# Patient Record
Sex: OTHER/UNKNOWN | URBAN - METROPOLITAN AREA
[De-identification: names, ages, dates, MRNs, and addresses within clinical notes are randomized per-mention and may not be internally consistent; named-entity substitution may affect disease eponyms.]

---

## 2019-02-19 ENCOUNTER — APPOINTMENT (OUTPATIENT)
Age: 34
Setting detail: DERMATOLOGY
End: 2019-02-20

## 2019-02-19 VITALS
WEIGHT: 210 LBS | HEART RATE: 83 BPM | SYSTOLIC BLOOD PRESSURE: 113 MMHG | DIASTOLIC BLOOD PRESSURE: 52 MMHG | HEIGHT: 69 IN

## 2019-02-19 DIAGNOSIS — L85.3 XEROSIS CUTIS: ICD-10-CM

## 2019-02-19 DIAGNOSIS — L71.8 OTHER ROSACEA: ICD-10-CM

## 2019-02-19 PROCEDURE — 99214 OFFICE O/P EST MOD 30 MIN: CPT

## 2019-02-19 PROCEDURE — OTHER COUNSELING: OTHER

## 2019-02-19 PROCEDURE — OTHER PRESCRIPTION: OTHER

## 2019-02-19 PROCEDURE — OTHER REASSURANCE: OTHER

## 2019-02-19 PROCEDURE — OTHER MIPS QUALITY: OTHER

## 2019-02-19 PROCEDURE — OTHER TREATMENT REGIMEN: OTHER

## 2019-02-19 RX ORDER — METRONIDAZOLE 7.5 MG/G
1 GEL TOPICAL QD
Qty: 1 | Refills: 1 | Status: ERX | COMMUNITY
Start: 2019-02-19

## 2019-02-19 RX ORDER — IVERMECTIN 10 MG/G
1 CREAM TOPICAL QPM
Qty: 1 | Refills: 1 | Status: ERX | COMMUNITY
Start: 2019-02-19

## 2019-02-19 ASSESSMENT — LOCATION DETAILED DESCRIPTION DERM
LOCATION DETAILED: LEFT CHIN
LOCATION DETAILED: INFERIOR MID FOREHEAD
LOCATION DETAILED: RIGHT CENTRAL MALAR CHEEK
LOCATION DETAILED: LEFT INFERIOR CENTRAL MALAR CHEEK
LOCATION DETAILED: RIGHT INFERIOR CENTRAL MALAR CHEEK
LOCATION DETAILED: NASAL DORSUM

## 2019-02-19 ASSESSMENT — LOCATION SIMPLE DESCRIPTION DERM
LOCATION SIMPLE: CHIN
LOCATION SIMPLE: NOSE
LOCATION SIMPLE: RIGHT CHEEK
LOCATION SIMPLE: INFERIOR FOREHEAD
LOCATION SIMPLE: LEFT CHEEK

## 2019-02-19 ASSESSMENT — LOCATION ZONE DERM
LOCATION ZONE: FACE
LOCATION ZONE: NOSE

## 2019-02-19 NOTE — HPI: PIMPLES (ACNE)
What Type Of Note Output Would You Prefer (Optional)?: Standard Output
How Severe Is Your Acne?: moderate
Is This A New Presentation, Or A Follow-Up?: Acne
Females Only: When Was Your Last Menstrual Period?: 02/23/2019

## 2019-02-19 NOTE — PROCEDURE: TREATMENT REGIMEN
Otc Regimen: CeraVe moisturizing cream
Samples Given: Soolantra, CeraVe moisturizing cream, cerave hydrating cleanser
Detail Level: Zone
Initiate Treatment: Soolantra: Apply a pea size amount to face every evening.\\n\\nmetronidazole: Apply a pea sized amount to face in AM once a day after  washing.

## 2019-02-26 ENCOUNTER — RX ONLY (RX ONLY)
Age: 34
End: 2019-02-26

## 2019-02-26 RX ORDER — IVERMECTIN 10 MG/G
1 CREAM TOPICAL QPM
Qty: 1 | Refills: 1 | Status: ERX

## 2019-02-26 RX ORDER — METRONIDAZOLE 7.5 MG/G
1 GEL TOPICAL QD
Qty: 1 | Refills: 1 | Status: ERX

## 2020-02-04 ENCOUNTER — RX ONLY (RX ONLY)
Age: 35
End: 2020-02-04

## 2020-02-04 ENCOUNTER — APPOINTMENT (OUTPATIENT)
Age: 35
Setting detail: DERMATOLOGY
End: 2020-02-05

## 2020-02-04 VITALS
WEIGHT: 209 LBS | DIASTOLIC BLOOD PRESSURE: 91 MMHG | HEART RATE: 70 BPM | SYSTOLIC BLOOD PRESSURE: 136 MMHG | HEIGHT: 69.5 IN

## 2020-02-04 DIAGNOSIS — D18.0 HEMANGIOMA: ICD-10-CM

## 2020-02-04 DIAGNOSIS — D22 MELANOCYTIC NEVI: ICD-10-CM

## 2020-02-04 DIAGNOSIS — L663 OTHER SPECIFIED DISEASES OF HAIR AND HAIR FOLLICLES: ICD-10-CM

## 2020-02-04 DIAGNOSIS — L71.8 OTHER ROSACEA: ICD-10-CM

## 2020-02-04 DIAGNOSIS — Z71.89 OTHER SPECIFIED COUNSELING: ICD-10-CM

## 2020-02-04 DIAGNOSIS — L738 OTHER SPECIFIED DISEASES OF HAIR AND HAIR FOLLICLES: ICD-10-CM

## 2020-02-04 DIAGNOSIS — L82.1 OTHER SEBORRHEIC KERATOSIS: ICD-10-CM

## 2020-02-04 DIAGNOSIS — L81.8 OTHER SPECIFIED DISORDERS OF PIGMENTATION: ICD-10-CM

## 2020-02-04 PROBLEM — D22.72 MELANOCYTIC NEVI OF LEFT LOWER LIMB, INCLUDING HIP: Status: ACTIVE | Noted: 2020-02-04

## 2020-02-04 PROBLEM — L70.0 ACNE VULGARIS: Status: ACTIVE | Noted: 2020-02-04

## 2020-02-04 PROBLEM — D22.71 MELANOCYTIC NEVI OF RIGHT LOWER LIMB, INCLUDING HIP: Status: ACTIVE | Noted: 2020-02-04

## 2020-02-04 PROBLEM — D22.5 MELANOCYTIC NEVI OF TRUNK: Status: ACTIVE | Noted: 2020-02-04

## 2020-02-04 PROBLEM — D18.01 HEMANGIOMA OF SKIN AND SUBCUTANEOUS TISSUE: Status: ACTIVE | Noted: 2020-02-04

## 2020-02-04 PROBLEM — D22.22 MELANOCYTIC NEVI OF LEFT EAR AND EXTERNAL AURICULAR CANAL: Status: ACTIVE | Noted: 2020-02-04

## 2020-02-04 PROBLEM — L02.821 FURUNCLE OF HEAD [ANY PART, EXCEPT FACE]: Status: ACTIVE | Noted: 2020-02-04

## 2020-02-04 PROBLEM — D22.21 MELANOCYTIC NEVI OF RIGHT EAR AND EXTERNAL AURICULAR CANAL: Status: ACTIVE | Noted: 2020-02-04

## 2020-02-04 PROCEDURE — OTHER REASSURANCE: OTHER

## 2020-02-04 PROCEDURE — OTHER PRESCRIPTION: OTHER

## 2020-02-04 PROCEDURE — OTHER COUNSELING: OTHER

## 2020-02-04 PROCEDURE — OTHER TREATMENT REGIMEN: OTHER

## 2020-02-04 PROCEDURE — OTHER MIPS QUALITY: OTHER

## 2020-02-04 PROCEDURE — 99214 OFFICE O/P EST MOD 30 MIN: CPT

## 2020-02-04 RX ORDER — IVERMECTIN 10 MG/G
1 CREAM TOPICAL QPM
Qty: 1 | Refills: 3 | Status: ERX

## 2020-02-04 RX ORDER — CLINDAMYCIN PHOSPHATE 10 MG/ML
PEA SIZE LOTION TOPICAL QD
Qty: 1 | Refills: 4 | Status: ERX | COMMUNITY
Start: 2020-02-04

## 2020-02-04 RX ORDER — METRONIDAZOLE 7.5 MG/G
1 GEL TOPICAL QD
Qty: 1 | Refills: 3 | Status: ERX

## 2020-02-04 RX ORDER — IVERMECTIN 10 MG/G
1 CREAM TOPICAL QPM
Qty: 1 | Refills: 1 | Status: ACTIVE

## 2020-02-04 ASSESSMENT — LOCATION DETAILED DESCRIPTION DERM
LOCATION DETAILED: LEFT INFERIOR UPPER BACK
LOCATION DETAILED: RIGHT CENTRAL FRONTAL SCALP
LOCATION DETAILED: LEFT CENTRAL FRONTAL SCALP
LOCATION DETAILED: LEFT ANTERIOR PROXIMAL THIGH
LOCATION DETAILED: RIGHT SUPERIOR UPPER BACK
LOCATION DETAILED: RIGHT MEDIAL SUPERIOR CHEST
LOCATION DETAILED: RIGHT SUPERIOR PARIETAL SCALP
LOCATION DETAILED: LEFT SUPERIOR MEDIAL MIDBACK
LOCATION DETAILED: LEFT ANKLE
LOCATION DETAILED: LEFT ANTERIOR DISTAL THIGH
LOCATION DETAILED: RIGHT SUPERIOR MEDIAL LOWER BACK
LOCATION DETAILED: RIGHT MID-UPPER BACK
LOCATION DETAILED: LEFT INFERIOR CENTRAL MALAR CHEEK
LOCATION DETAILED: RIGHT LATERAL SUPERIOR CHEST
LOCATION DETAILED: RIGHT CAVUM CONCHA
LOCATION DETAILED: LEFT DISTAL PRETIBIAL REGION
LOCATION DETAILED: LEFT DORSAL FOOT
LOCATION DETAILED: RIGHT INFERIOR CENTRAL MALAR CHEEK
LOCATION DETAILED: NASAL DORSUM
LOCATION DETAILED: LEFT ANTITRAGUS
LOCATION DETAILED: LEFT LATERAL DORSAL FOOT
LOCATION DETAILED: RIGHT ANTERIOR LATERAL PROXIMAL THIGH
LOCATION DETAILED: RIGHT ANTERIOR DISTAL THIGH
LOCATION DETAILED: LEFT SUPERIOR LATERAL MIDBACK
LOCATION DETAILED: RIGHT PROXIMAL POSTERIOR THIGH
LOCATION DETAILED: RIGHT SUPERIOR OCCIPITAL SCALP
LOCATION DETAILED: SUPERIOR LUMBAR SPINE
LOCATION DETAILED: LEFT SUPERIOR UPPER BACK

## 2020-02-04 ASSESSMENT — LOCATION SIMPLE DESCRIPTION DERM
LOCATION SIMPLE: RIGHT THIGH
LOCATION SIMPLE: LEFT EAR
LOCATION SIMPLE: LEFT FOOT
LOCATION SIMPLE: LEFT SCALP
LOCATION SIMPLE: CHEST
LOCATION SIMPLE: LEFT THIGH
LOCATION SIMPLE: LEFT LOWER BACK
LOCATION SIMPLE: RIGHT POSTERIOR THIGH
LOCATION SIMPLE: RIGHT LOWER BACK
LOCATION SIMPLE: RIGHT OCCIPITAL SCALP
LOCATION SIMPLE: LEFT UPPER BACK
LOCATION SIMPLE: LOWER BACK
LOCATION SIMPLE: SCALP
LOCATION SIMPLE: LEFT PRETIBIAL REGION
LOCATION SIMPLE: LEFT CHEEK
LOCATION SIMPLE: LEFT ANKLE
LOCATION SIMPLE: RIGHT EAR
LOCATION SIMPLE: RIGHT UPPER BACK
LOCATION SIMPLE: RIGHT SCALP
LOCATION SIMPLE: NOSE
LOCATION SIMPLE: RIGHT CHEEK

## 2020-02-04 ASSESSMENT — SEVERITY ASSESSMENT OVERALL AMONG ALL PATIENTS
IN YOUR EXPERIENCE, AMONG ALL PATIENTS YOU HAVE SEEN WITH THIS CONDITION, HOW SEVERE IS THIS PATIENT'S CONDITION?: MINIMAL

## 2020-02-04 ASSESSMENT — PAIN INTENSITY VAS: HOW INTENSE IS YOUR PAIN 0 BEING NO PAIN, 10 BEING THE MOST SEVERE PAIN POSSIBLE?: NO PAIN

## 2020-02-04 ASSESSMENT — SEVERITY ASSESSMENT: SEVERITY: MILD

## 2020-02-04 ASSESSMENT — LOCATION ZONE DERM
LOCATION ZONE: EAR
LOCATION ZONE: LEG
LOCATION ZONE: FEET
LOCATION ZONE: FACE
LOCATION ZONE: NOSE
LOCATION ZONE: TRUNK
LOCATION ZONE: SCALP

## 2020-02-04 NOTE — PROCEDURE: TREATMENT REGIMEN
Initiate Treatment: clindamycin 1 % lotion Qd\\nDays Supply: 30\\nSig: Apply to affected areas on scalp daily every morning
Detail Level: Zone
Otc Regimen: Take a Zyrtec at bedtime as needed.
Continue Regimen: Soolantra: Apply a pea size amount to face every evening.\\n\\nmetronidazole: Apply a pea sized amount to face in AM once a day after  washing.
Samples Given: Aveeno sunscreen\\nDove Body wash

## 2020-02-04 NOTE — PROCEDURE: MIPS QUALITY
Quality 128: Preventive Care And Screening: Body Mass Index (Bmi) Screening And Follow-Up Plan: BMI is documented within normal parameters and no follow-up plan is required.
Detail Level: Detailed
Quality 110: Preventive Care And Screening: Influenza Immunization: Influenza Immunization not Administered because Patient Refused.
Quality 226: Preventive Care And Screening: Tobacco Use: Screening And Cessation Intervention: Patient screened for tobacco use and is an ex/non-smoker